# Patient Record
Sex: FEMALE | Race: WHITE | HISPANIC OR LATINO | URBAN - METROPOLITAN AREA
[De-identification: names, ages, dates, MRNs, and addresses within clinical notes are randomized per-mention and may not be internally consistent; named-entity substitution may affect disease eponyms.]

---

## 2022-03-16 ENCOUNTER — APPOINTMENT (RX ONLY)
Dept: URBAN - METROPOLITAN AREA CLINIC 90 | Facility: CLINIC | Age: 52
Setting detail: DERMATOLOGY
End: 2022-03-16

## 2022-03-16 VITALS — HEIGHT: 67 IN | WEIGHT: 183 LBS

## 2022-03-16 DIAGNOSIS — B35.1 TINEA UNGUIUM: ICD-10-CM

## 2022-03-16 DIAGNOSIS — L81.5 LEUKODERMA, NOT ELSEWHERE CLASSIFIED: ICD-10-CM

## 2022-03-16 DIAGNOSIS — Z80.8 FAMILY HISTORY OF MALIGNANT NEOPLASM OF OTHER ORGANS OR SYSTEMS: ICD-10-CM

## 2022-03-16 DIAGNOSIS — L81.4 OTHER MELANIN HYPERPIGMENTATION: ICD-10-CM

## 2022-03-16 DIAGNOSIS — D22 MELANOCYTIC NEVI: ICD-10-CM

## 2022-03-16 DIAGNOSIS — L259 CONTACT DERMATITIS AND OTHER ECZEMA, UNSPECIFIED CAUSE: ICD-10-CM

## 2022-03-16 DIAGNOSIS — L91.8 OTHER HYPERTROPHIC DISORDERS OF THE SKIN: ICD-10-CM

## 2022-03-16 PROBLEM — D22.9 MELANOCYTIC NEVI, UNSPECIFIED: Status: ACTIVE | Noted: 2022-03-16

## 2022-03-16 PROBLEM — L30.8 OTHER SPECIFIED DERMATITIS: Status: ACTIVE | Noted: 2022-03-16

## 2022-03-16 PROCEDURE — ? TREATMENT REGIMEN

## 2022-03-16 PROCEDURE — ? PRESCRIPTION

## 2022-03-16 PROCEDURE — ? SKIN TAG REMOVAL

## 2022-03-16 PROCEDURE — 11200 RMVL SKIN TAGS UP TO&INC 15: CPT

## 2022-03-16 PROCEDURE — 99203 OFFICE O/P NEW LOW 30 MIN: CPT | Mod: 25

## 2022-03-16 PROCEDURE — ? COUNSELING

## 2022-03-16 RX ORDER — CICLOPIROX OLAMINE 7.7 MG/100ML
SUSPENSION TOPICAL BID
Qty: 1 | Refills: 6 | Status: ERX | COMMUNITY
Start: 2022-03-16

## 2022-03-16 RX ORDER — TRIAMCINOLONE ACETONIDE 1 MG/G
CREAM TOPICAL BID
Qty: 1 | Refills: 3 | Status: ERX | COMMUNITY
Start: 2022-03-16

## 2022-03-16 RX ADMIN — CICLOPIROX OLAMINE: 7.7 SUSPENSION TOPICAL at 00:00

## 2022-03-16 RX ADMIN — TRIAMCINOLONE ACETONIDE: 1 CREAM TOPICAL at 00:00

## 2022-03-16 ASSESSMENT — LOCATION ZONE DERM
LOCATION ZONE: AXILLAE
LOCATION ZONE: TOE
LOCATION ZONE: LEG
LOCATION ZONE: ARM
LOCATION ZONE: TRUNK

## 2022-03-16 ASSESSMENT — LOCATION DETAILED DESCRIPTION DERM
LOCATION DETAILED: LEFT DISTAL DORSAL FOREARM
LOCATION DETAILED: RIGHT PROXIMAL DORSAL FOREARM
LOCATION DETAILED: RIGHT DISTAL DORSAL FOREARM
LOCATION DETAILED: RIGHT AXILLARY VAULT
LOCATION DETAILED: LEFT SUPRAPUBIC SKIN
LOCATION DETAILED: RIGHT DISTAL PRETIBIAL REGION
LOCATION DETAILED: LEFT PROXIMAL DORSAL FOREARM
LOCATION DETAILED: RIGHT SUPRAPUBIC SKIN
LOCATION DETAILED: LEFT DISTAL PRETIBIAL REGION
LOCATION DETAILED: LEFT DORSAL GREAT TOE
LOCATION DETAILED: RIGHT DORSAL GREAT TOE

## 2022-03-16 ASSESSMENT — LOCATION SIMPLE DESCRIPTION DERM
LOCATION SIMPLE: RIGHT AXILLARY VAULT
LOCATION SIMPLE: LEFT FOREARM
LOCATION SIMPLE: LEFT PRETIBIAL REGION
LOCATION SIMPLE: LEFT GREAT TOE
LOCATION SIMPLE: GROIN
LOCATION SIMPLE: RIGHT GREAT TOE
LOCATION SIMPLE: RIGHT FOREARM
LOCATION SIMPLE: RIGHT PRETIBIAL REGION

## 2022-03-16 NOTE — PROCEDURE: SKIN TAG REMOVAL
Anesthesia Volume In Cc: 0.5
Add Associated Diagnoses If Applicable When Selecting Medical Necessity: Yes
Consent: Written consent obtained and the risks of skin tag removal was reviewed with the patient including but not limited to bleeding, pigmentary change, infection, pain, and remote possibility of scarring.
Detail Level: Zone
Medical Necessity Information: It is in your best interest to select a reason for this procedure from the list below. All of these items fulfill various CMS LCD requirements except the new and changing color options.
Include Z78.9 (Other Specified Conditions Influencing Health Status) As An Associated Diagnosis?: No
Medical Necessity Clause: This procedure was medically necessary because the lesions that were treated were:

## 2024-08-06 ENCOUNTER — APPOINTMENT (RX ONLY)
Dept: URBAN - METROPOLITAN AREA CLINIC 86 | Facility: CLINIC | Age: 54
Setting detail: DERMATOLOGY
End: 2024-08-06

## 2024-08-06 VITALS — WEIGHT: 149 LBS | HEIGHT: 67 IN

## 2024-08-06 DIAGNOSIS — L81.4 OTHER MELANIN HYPERPIGMENTATION: ICD-10-CM

## 2024-08-06 DIAGNOSIS — L23.9 ALLERGIC CONTACT DERMATITIS, UNSPECIFIED CAUSE: ICD-10-CM

## 2024-08-06 PROCEDURE — ? COUNSELING

## 2024-08-06 PROCEDURE — ? DEFER

## 2024-08-06 PROCEDURE — ? PRESCRIPTION

## 2024-08-06 PROCEDURE — 99203 OFFICE O/P NEW LOW 30 MIN: CPT

## 2024-08-06 PROCEDURE — ? PRESCRIPTION MEDICATION MANAGEMENT

## 2024-08-06 RX ORDER — TRIAMCINOLONE ACETONIDE 1 MG/G
CREAM TOPICAL BID
Qty: 30 | Refills: 1 | Status: ERX | COMMUNITY
Start: 2024-08-06

## 2024-08-06 RX ADMIN — TRIAMCINOLONE ACETONIDE: 1 CREAM TOPICAL at 00:00

## 2024-08-06 ASSESSMENT — LOCATION SIMPLE DESCRIPTION DERM
LOCATION SIMPLE: LEFT UPPER BACK
LOCATION SIMPLE: RIGHT EYEBROW

## 2024-08-06 ASSESSMENT — LOCATION ZONE DERM
LOCATION ZONE: FACE
LOCATION ZONE: TRUNK

## 2024-08-06 ASSESSMENT — LOCATION DETAILED DESCRIPTION DERM
LOCATION DETAILED: LEFT SUPERIOR UPPER BACK
LOCATION DETAILED: RIGHT CENTRAL EYEBROW

## 2024-08-06 NOTE — PROCEDURE: PRESCRIPTION MEDICATION MANAGEMENT
Render In Strict Bullet Format?: No
Detail Level: Simple
Initiate Treatment: Triamcinolone 0.1% cream BID to back for 2 weeks

## 2024-08-06 NOTE — PROCEDURE: DEFER
Detail Level: Detailed
Procedure To Be Performed At Next Visit: Laser: Q-switched Nd:Yag 532nm
X Size Of Lesion In Cm (Optional): 0
Introduction Text (Please End With A Colon): The following procedure was deferred:

## 2024-08-06 NOTE — HPI: RASH
What Type Of Note Output Would You Prefer (Optional)?: Standard Output
How Severe Is Your Rash?: moderate
Is This A New Presentation, Or A Follow-Up?: Rash
Additional History: Patient states she brought clothes from a warehouse and she developed a rash.Her primary care provider prescribed antihistamines on 7/4/24.The rash is better but not fully improved.

## 2024-10-04 ENCOUNTER — APPOINTMENT (RX ONLY)
Dept: URBAN - METROPOLITAN AREA CLINIC 86 | Facility: CLINIC | Age: 54
Setting detail: DERMATOLOGY
End: 2024-10-04

## 2024-10-04 VITALS — WEIGHT: 148 LBS | HEIGHT: 67 IN

## 2024-10-04 DIAGNOSIS — L81.4 OTHER MELANIN HYPERPIGMENTATION: ICD-10-CM

## 2024-10-04 PROCEDURE — ? MEDLITE C6

## 2024-10-04 ASSESSMENT — LOCATION DETAILED DESCRIPTION DERM
LOCATION DETAILED: RIGHT INFERIOR FOREHEAD
LOCATION DETAILED: LEFT CENTRAL MALAR CHEEK
LOCATION DETAILED: RIGHT CENTRAL MALAR CHEEK

## 2024-10-04 ASSESSMENT — LOCATION ZONE DERM: LOCATION ZONE: FACE

## 2024-10-04 ASSESSMENT — LOCATION SIMPLE DESCRIPTION DERM
LOCATION SIMPLE: RIGHT CHEEK
LOCATION SIMPLE: RIGHT FOREHEAD
LOCATION SIMPLE: LEFT CHEEK

## 2024-10-04 NOTE — PROCEDURE: MEDLITE C6
Consent: Written consent obtained.  Risks reviewed including but not limited to erythema, swelling, crusting, scabbing, blistering, scarring, and darker or lighter pigmentary change.  Patient understands that results are not guaranteed and multiple treatments may be needed to achieve desired result.
Add Another Setting?: no
Spot Size: 3mm
Procedure Note: Treatment was administered using the setting parameters listed above.
Post-Care Instructions: I reviewed with the patient in detail post-care instructions. Patient should avoid sun exposure before and after treatment.  Diligent sunscreen use and sun avoidance advised.
Fluence (J/Cm2): 0.9
Anesthesia Type: 1% lidocaine with epinephrine
Repetition Rate (Hz): 10
Price (Use Numbers Only, No Special Characters Or $): 300
Treatment Number: 1
Number Of Prepaid Treatments (Will Not Render If 0): 0
Wavelength: 532nm
Detail Level: Zone